# Patient Record
Sex: FEMALE | Race: WHITE | NOT HISPANIC OR LATINO | Employment: UNEMPLOYED | URBAN - METROPOLITAN AREA
[De-identification: names, ages, dates, MRNs, and addresses within clinical notes are randomized per-mention and may not be internally consistent; named-entity substitution may affect disease eponyms.]

---

## 2024-08-02 ENCOUNTER — TELEPHONE (OUTPATIENT)
Dept: ORTHOPEDIC SURGERY | Facility: CLINIC | Age: 54
End: 2024-08-02
Payer: COMMERCIAL

## 2024-08-02 NOTE — TELEPHONE ENCOUNTER
[facility] received a fax that requires your attention. The document has been indexed to the patient’s chart for your review.      Reason for sending: RECEIVED DENIAL FROM [facility] FOR SHOULDER SURGERY    Documents Description: SURG DENIAL-[facility]-8/1/2024    Name of Sender: [facility]    Date Indexed: 8/2/2024

## 2024-08-16 ENCOUNTER — DOCUMENTATION (OUTPATIENT)
Dept: ORTHOPEDIC SURGERY | Facility: CLINIC | Age: 54
End: 2024-08-16

## 2024-08-16 DIAGNOSIS — S43.003A SUBLUXATION OF TENDON OF LONG HEAD OF BICEPS: ICD-10-CM

## 2024-08-16 DIAGNOSIS — M75.41 IMPINGEMENT SYNDROME OF RIGHT SHOULDER: ICD-10-CM

## 2024-08-16 DIAGNOSIS — M75.121 NONTRAUMATIC COMPLETE TEAR OF RIGHT ROTATOR CUFF: ICD-10-CM

## 2024-08-16 DIAGNOSIS — Z98.890 STATUS POST RIGHT ROTATOR CUFF REPAIR: Primary | ICD-10-CM

## 2024-08-16 DIAGNOSIS — M25.511 RIGHT SHOULDER PAIN, UNSPECIFIED CHRONICITY: ICD-10-CM

## 2024-08-16 DIAGNOSIS — M75.21 BICEPS TENDINITIS OF RIGHT UPPER EXTREMITY: ICD-10-CM

## 2024-08-16 DIAGNOSIS — M75.51 BURSITIS OF RIGHT SHOULDER: ICD-10-CM

## 2024-08-16 RX ORDER — HYDROCODONE BITARTRATE AND ACETAMINOPHEN 10; 325 MG/1; MG/1
1 TABLET ORAL EVERY 6 HOURS PRN
Qty: 28 TABLET | Refills: 0 | Status: SHIPPED | OUTPATIENT
Start: 2024-08-16 | End: 2024-08-23

## 2024-08-16 RX ORDER — METHOCARBAMOL 500 MG/1
500 TABLET, FILM COATED ORAL 3 TIMES DAILY PRN
Qty: 42 TABLET | Refills: 0 | Status: SHIPPED | OUTPATIENT
Start: 2024-08-16 | End: 2024-08-30

## 2024-08-16 RX ORDER — ONDANSETRON 4 MG/1
4 TABLET, FILM COATED ORAL EVERY 6 HOURS PRN
Qty: 30 TABLET | Refills: 1 | Status: SHIPPED | OUTPATIENT
Start: 2024-08-16 | End: 2024-08-24

## 2024-08-16 NOTE — PROGRESS NOTES
Operative Report     Side/SHOULDER: Right shoulder    LOCATION:   [facility]  [address] [place] [address]    [facility] OPERATIVE REPORT         Surgeon: Paul Mullins MD     Assistant: ROSY Valle     The skilled assistance of the above noted first assistant was necessary during this complex surgical procedure.  The surgical assistant assisted with every aspect of the operation including, but not limited to, proper and safe positioning of the patient, obtaining adequate surgical exposure, manipulation of surgical instruments, suture management, surgical knot tying when necessary, the continual process of hemostasis during the procedure itself in addition to surgical wound closure and removal of the patient from the operating table and returning the patient back to the [facility].  The assistance of the surgical assistant allowed me to perform the most sensitive and technical potions of this operation using 2 hands, thus enhancing efficiency and patient safety.  This would not be possible without the help of a skilled assistant familiar with the procedure and capable of safely performing the aforementioned tasks.     Date of Surgery: 8/16/2024  Shoulder: RIGHT shoulder  Preoperative Diagnosis:  1.     Rotator cuff tear, chronic dysplastic features, full-thickness supraspinatus tearing, thin floppy tissue, subscapularis tearing upper border- treated with arthroscopic rotator cuff repair - CPT 72895  2.     Biceps tendinopathy, SLAP tearing, near full-thickness long head of the biceps tearing- treated with arthroscopic biceps tenodesis - CPT 37121  3.     Shoulder impingement syndrome, CA ligament tearing- treated with arthroscopic subacromial decompression with acromioplasty - CPT 50196     Postoperative Diagnosis:  1.     SAME as preoperative diagnoses     Procedure:  1.     Arthroscopic rotator cuff repair (1x2) and 1  anchor subscapularis repair- CPT 19452  2.     Arthroscopic biceps tenodesis (8-8)- CPT 71051  3.     Arthroscopic subacromial decompression with acromioplasty - CPT 70421        Admission status: elective outpatient surgery     Complications: None     EBL: 15mL     Specimen: NONE     Anesthesia: general anesthesia     Block: regional interscalene block with single shot per anesthesia     Antibiotics: weight based IV antibiotics infused prior to incision     Time out: Time out was called and the patient, side, site, and intended procedures were confirmed.     Counts: Needle and sponge counts were correct prior to closure.     Marked: The patient was marked with an indelible marker in the preoperative holding area confirming the correct side and site.     History & Physical:   A history and physical examination was completed and updated in the preoperative holding area.     Consent: The patient signed the consent form for surgery with an understanding of the risks and benefits as outlined in the clinic and in the preoperative holding area.     Risks and Benefits: Specific risks and benefits discussed included - pain, bleeding, infection, injury to nerves or blood vessels, fracture, stiffness, failure to heal, revision surgery, deformity of biceps or asymmetry, complications of the block, anesthetic complications, and medical complications associated with surgery.        Indications for surgery:  The patient has history, physical examination, and radiographic findings confirming the diagnoses.  The patient has persistent pain and functional loss unresponsive to non-operative treatment consisting of selective rest/activity modification, medications, and exercises.  MRI documented the status of the rotator cuff - rotator cuff tearing was noted.     Impingement syndrome - the patient had history and clinical exam findings consistent with shoulder impingement syndrome.  Additionally, the patient had subacromial bursitis  which was encountered during the arthroscopic shoulder procedure.  Subacromial decompression with minimal acromioplasty was indicated as part of the treatment of impingement syndrome, and additionally to enhance arthroscopic visualization to enable minimally invasive, arthroscopic rotator cuff repair.      Patient is aware of her chronic dysplastic features and potential history of autoimmune disorder.  Originally thought to be lupus possibly RA.  Last evaluation was March 2024 with an MRI from February 2024.  She has chronic dysplastic features significant intraoperative findings noted chronic dysplastic then tissue of the rotator cuff upper border subscapularis tearing as well and near full-thickness tearing long head of biceps with degenerative labral tearing.        Operative Findings:  Rotator Cuff Tissue Quality: Chronic dysplastic features as noted     Status of the Rotator Cuff:  Supraspinatus -full-thickness tearing, thin dysplastic some posterior extension  Subscapularis -upper border tearing     Size of Rotator Cuff Tear:  Supraspinatus -18 mm  Upper border subscapularis tearRotator Cuff Repair Construct:  1x2 Transosseous Equivalent Double Row Arthroscopic Rotator Cuff Repair      Rotator Cuff Implants:  Medial Row: 1 triple loaded [employer] 5.5mm Healicoil PEEK   Lateral Row: 2 Lateral Anchors - [employer] 5.5mm Footprint PEEK      Subscapularis repair: 1 double loaded [employer] 4.5mm Healicoil PEEK    Bone Quality: The bone quality was variable solid fixation on the medial row and the subscapularis anchor relatively soft bone on the anterolateral anchor but ultimately good fixation and solid fixation with the posterolateral anchor.     Labrum: Degenerative labral tearing fairly significant     Biceps: tendinopathy and synovitis and significant near full-thickness tearing long head of the biceps intra-articular tearing and extra-articular tearing     Biceps Tenodesis  Construct:  Suprapectoral biceps tenodesis in the bicipital groove     Biceps Tendon Implant:  Arthrex SwiveLock Biceps Tenodesis BioComposite screw  Drill Size: 8mm  Screw Size: 8mm     Humeral Head: Mild degenerative  Glenoid: Degenerative labral tearing is noted     Contracture: Negative  Pathological laxity: Negative     Procedure in detail:  Regional interscalene block was completed in the preoperative area by the anesthesia team.  The patient was supine on the operative table and the anesthesia team initiated general anesthesia.  The patient was placed in a modified beach chair position with the neck secured in neutral alignment and all bony prominences were well padded.     The shoulder was assessed with an examination under anesthesia.     The operative extremity was cleaned with alcohol and then the extremity was prepped and draped in the standard fashion.  The surgical arm was placed into a well-padded arm banks. A standard posterior portal was created with an incision made 1 cm inferior 1 cm medial to the posterolateral acromial corner.  A blunt metal trocar and cannula were inserted into the glenohumeral joint.  The arthroscopic pump was connected and the above findings and diagnoses were noted as part of the diagnostic shoulder arthroscopy.       A spinal needle was used to localize the anterior portal position in the rotator interval. A 5.5mm plastic cannula and trocar were inserted followed by a 4.5mm shaver/cautery device.  The shaver was used for debridement in the glenohumeral joint.  Arthroscopic scissors were used to perform biceps tenotomy of the pathologic biceps tendon prior to subsequent biceps tenodesis.  The remaining intra-articular portion of the biceps tendon was debrided using the shaver/cautery device.  Severe labral and biceps pathology noted along with tearing upper border subscapularis with loss of the rolled edge which was restored nicely following repair.     The arthroscope and  metal cannula were then removed in order to transition to the subacromial space.  The blunt metal trocar and cannula were inserted into the subacromial space and the lateral portal site identified with a spinal needle.  An 8 mm plastic cannula and trocar were inserted.  I turned my attention to the arthroscopic subacromial decompression with acromioplasty. Bursitis was noted in the subacromial space and impacted visualization of the rotator cuff.  The 4.5mm shaver/cautery device was used to clear the subacromial space until the acromion could be identified and bursal resection was completed to allow rotator cuff visualization.  The shaver was used to remove fibrous tissue from the acromial undersurface until the anterolateral and anterior medial corners of the acromion were clearly identified.  The coracoacromial ligament was not released but CA ligament tearing was noted and debrided as part of the subacromial decompression.  The shaver was used to perform a minimal acromioplasty.  The shaver/cautery device was used to perform the subacromial decompression with minimal acromioplasty.     I turned my attention to the arthroscopic biceps tenodesis. The arm was placed in a slightly external rotator position and the elbow flexed and shoulder forward flexed into a biceps tendon repair position.  The biceps tendon repair position in achieved in order to try to maintain proper biceps tendon length-tension relationship during the repair.  The biceps sheath was opened using the shaver/cautery device and the pathologic biceps tendon was visualized.  The appropriately sized drill bit was used to create a drill hole for the tendon above the pectoralis major tendon and within the bicipital groove.  The biceps tendon was placed into the drill hole and the screw inserted and tested.  The arthroscopic biceps tenodesis was completed and the repair was noted to be dynamically stable with a solid repair.     I turned my attention to  the rotator cuff tear and the arthroscopic rotator cuff repair.  The torn rotator cuff tendon was grasped with a handheld grasper and assessed for mobility and integrity.  The soft tissue at the greater tuberosity insertion site was removed and a power shaver was used to create a healthy bleeding bed to enhance rotator cuff tendon healing.     Arthroscopic rotator cuff suture anchors were then inserted for the rotator cuff repair.  The single medial row arthroscopic rotator cuff repair anchor was inserted and the sutures from the anchors were withdrawn through the anterior cannula. An arthroscopic suture passer was used to place the high strength sutures through the rotator cuff tendon in an inverted mattress fashion. The sutures were then inserted laterally into 2 lateral knotless anchors in a cruciform pattern with appropriate tensioning.  I transition to the subscapularis portion of the repair open the rotator interval following the biceps tenodesis and clearly identified the torn upper border the subscapularis with some poor quality tissue but ultimately nice restoration of the upper border.  Cleaned off the lesser tuberosity placed a double loaded 4.5 mm anchor and performed a subscapularis repair in a cruciform pattern.  Used arthroscopic knot tying technique with solid fixation and dynamic stability noted.  Nice restoration of the upper border the subscapularis.    The completed arthroscopic rotator cuff repair was inspected dynamically and the arthroscopic instruments removed along with the arthroscopic fluid. The incisions were closed with nylon suture and steri-strips were placed over the incisions.  A sterile dressing was applied followed by a neutral rotation sling.  The patient tolerated the procedure well and was transported to the recovery room in satisfactory condition.       Rotator Cuff Repair - POSTOPERATIVE PLAN:  Follow-up in the office in 2 weeks with 1 view of the operative shoulder at that  time  Sutures: Nylon sutures to come out in 2 weeks  DVT prophylaxis: No additional chemical DVT prophylaxis indicated  Weightbearing: Nonweightbearing on operative extremity, no biceps loading, pendulums/elbow/wrist/hand motion encouraged  Neutral rotation sling for 3 to 4 weeks following the surgery  Physical therapy: Formal outpatient physical therapy will be provided at the 2-week appointment in the office.  Rotator cuff repair protocol      Electronically signed by Paul Mullins MD, 08/16/24, 12:03 PM EDT.